# Patient Record
Sex: FEMALE | Race: WHITE | URBAN - METROPOLITAN AREA
[De-identification: names, ages, dates, MRNs, and addresses within clinical notes are randomized per-mention and may not be internally consistent; named-entity substitution may affect disease eponyms.]

---

## 2017-04-07 ENCOUNTER — HOSPITAL ENCOUNTER (EMERGENCY)
Facility: CLINIC | Age: 67
Discharge: HOME | End: 2017-04-07
Attending: EMERGENCY MEDICINE | Admitting: EMERGENCY MEDICINE
Payer: MEDICARE

## 2017-04-07 VITALS
TEMPERATURE: 98.2 F | WEIGHT: 160 LBS | RESPIRATION RATE: 18 BRPM | HEART RATE: 63 BPM | HEIGHT: 66 IN | OXYGEN SATURATION: 100 % | BODY MASS INDEX: 25.71 KG/M2 | DIASTOLIC BLOOD PRESSURE: 74 MMHG | SYSTOLIC BLOOD PRESSURE: 126 MMHG

## 2017-04-07 DIAGNOSIS — E87.5 HYPERKALEMIA: ICD-10-CM

## 2017-04-07 LAB — POTASSIUM SERPL-SCNC: 4.2 MMOL/L (ref 3.4–5.3)

## 2017-04-07 PROCEDURE — 99284 EMERGENCY DEPT VISIT MOD MDM: CPT

## 2017-04-07 PROCEDURE — 93005 ELECTROCARDIOGRAM TRACING: CPT

## 2017-04-07 PROCEDURE — 84132 ASSAY OF SERUM POTASSIUM: CPT | Performed by: EMERGENCY MEDICINE

## 2017-04-07 NOTE — ED AVS SNAPSHOT
Emergency Department    [address] [facility] [address]    Phone:  [phone]    Fax:  [phone]                                       Bronwyn Brooks   MRN: [id number]    Department:   Emergency Department   Date of Visit:  4/7/2017           Patient Information     Date Of Birth          1950        Your diagnoses for this visit were:     Hyperkalemia - resolved        You were seen by Trierweiler, Chad A, MD.      Follow-up Information     Follow up with Malgorzata Cordero NP In 1 week.    Specialty:  Nurse Practitioner - Family    Why:  As needed    Contact information:    XXX RETIRED XXX  [address]  [facility] [address] [phone]          Follow up with  Emergency Department.    Specialty:  EMERGENCY MEDICINE    Why:  If symptoms worsen    Contact information:    [address] [facility] [id number] [phone]        Discharge Instructions       Your labs are normal.  Continue to eat/drink normally.  Return to the ER for any emergent concerns.    24 Hour Appointment Hotline       To make an appointment at any [facility], call [phone] ([phone]). If you don't have a family doctor or clinic, we will help you find one. [facility] are conveniently located to serve the needs of you and your family.             Review of your medicines      Our records show that you are taking the medicines listed below. If these are incorrect, please call your family doctor or clinic.        Dose / Directions Last dose taken    CALCIUM 600 + D 600-200 MG-UNIT Tabs   Quantity:  3 MONTHS        1 tab twice daily   Refills:  1 YEAR        lisinopril 10 MG tablet   Commonly known as:  PRINIVIL/ZESTRIL   Dose:  10 mg   Quantity:  90 tablet        Take 1 tablet (10 mg) by mouth daily   Refills:  1        Multi-vitamin Tabs tablet   Quantity:  30   Generic drug:  multivitamin, therapeutic with minerals        1 TABLET DAILY   Refills:  0                 Procedures and tests performed during your visit     EKG 12 lead    Potassium      Orders Needing Specimen Collection     None      Pending Results     No orders found from 4/5/2017 to 4/8/2017.            Pending Culture Results     No orders found from 4/5/2017 to 4/8/2017.            Test Results From Your Hospital Stay        4/7/2017 10:21 PM      Component Results     Component Value Ref Range & Units Status    Potassium 4.2 3.4 - 5.3 mmol/L Final                Clinical Quality Measure: Blood Pressure Screening     Your blood pressure was checked while you were in the emergency department today. The last reading we obtained was  BP: 126/74 . Please read the guidelines below about what these numbers mean and what you should do about them.  If your systolic blood pressure (the top number) is less than 120 and your diastolic blood pressure (the bottom number) is less than 80, then your blood pressure is normal. There is nothing more that you need to do about it.  If your systolic blood pressure (the top number) is 120-139 or your diastolic blood pressure (the bottom number) is 80-89, your blood pressure may be higher than it should be. You should have your blood pressure rechecked within a year by a primary care provider.  If your systolic blood pressure (the top number) is 140 or greater or your diastolic blood pressure (the bottom number) is 90 or greater, you may have high blood pressure. High blood pressure is treatable, but if left untreated over time it can put you at risk for heart attack, stroke, or kidney failure. You should have your blood pressure rechecked by a primary care provider within the next 4 weeks.  If your provider in the emergency department today gave you specific instructions to follow-up with your doctor or provider even sooner than that, you should follow that instruction and not wait for up to 4 weeks for your follow-up visit.        Thank you for choosing [facility]       Thank you  for choosing [facility] for your care. Our goal is always to provide you with excellent care. Hearing back from our patients is one way we can continue to improve our services. Please take a few minutes to complete the written survey that you may receive in the mail after you visit with us. Thank you!        [employer] Information     [employer] gives you secure access to your electronic health record. If you see a primary care provider, you can also send messages to your care team and make appointments. If you have questions, please call your primary care clinic.  If you do not have a primary care provider, please call [phone] and they will assist you.        Care EveryWhere ID     This is your Care EveryWhere ID. This could be used by other organizations to access your [facility] medical records  [id number]        After Visit Summary       This is your record. Keep this with you and show to your community pharmacist(s) and doctor(s) at your next visit.

## 2017-04-07 NOTE — ED AVS SNAPSHOT
Emergency Department    [address] [place] [address]    Phone:  [phone]    Fax:  [phone]                                       Bronwyn Brooks   MRN: [id number]    Department:   Emergency Department   Date of Visit:  4/7/2017           After Visit Summary Signature Page     I have received my discharge instructions, and my questions have been answered. I have discussed any challenges I see with this plan with the nurse or doctor.    ..........................................................................................................................................  Patient/Patient Representative Signature      ..........................................................................................................................................  Patient Representative Print Name and Relationship to Patient    ..................................................               ................................................  Date                                            Time    ..........................................................................................................................................  Reviewed by Signature/Title    ...................................................              ..............................................  Date                                                            Time

## 2017-04-08 NOTE — ED PROVIDER NOTES
"  History     Chief Complaint:  Elevated Potassium Level    HPI   Bronwyn Brooks is a 66 year old female with a PMH significant for Raynaud's disease and hypertension who presents to the emergency department for evaluation of an elevated potassium level. The patient says she was having routine bloodwork done today when her potassium came back elevated at 6.0. Clinic staff immediately sent her to the ED for further evaluation. Here in the ED, the patient denies having any symptoms whatsoever.     Allergies:  No Known Drug Allergies    Medications:    Lisinopril    Past Medical History:    Absence of menstruation  Hypertension  Raynaud's disease    Past Surgical History:    Vaginal delivery  Appendectomy  D&C with ablation    Family History:    Cancer  Diabetes    Social History:   Tobacco use:    Negative  Alcohol use:    1.0 oz/week  Marital status:          Review of Systems   All other systems reviewed and are negative.    Physical Exam   First Vitals:  BP: 126/74  Pulse: 63  Temp: 98.2  F (36.8  C)  Resp: 18  Height: 167.6 cm (5' 6\")  Weight: 72.6 kg (160 lb)  SpO2: 100 %      Physical Exam  Musculoskeletal:  Normal movement of all extremities without evidence for deficit.    Skin:  Warm and dry without rashes.    Neurologic:  Non-focal exam without asymmetric weakness or numbness.    Psychiatric:  Normal affect with appropriate interaction with examiner.      Emergency Department Course   ECG (21:22:19):  Indication: Screening for cardiovascular disease.   Rate 60 bpm. TX interval 140. QRS duration 84. QT/QTc 412/412. P-R-T axes 50 49 49.   Interpretation: Normal sinus rhythm. Possible left atrial enlargement. Borderline ECG.   Agree with computer interpretation.   Interpreted at 2225 by Dr. Trierweiler.     Laboratory:  Potassium: 4.2     Emergency Department Course:  Nursing notes and vitals reviewed.   2220: I performed an exam of the patient as documented above.   The above workup was undertaken. "   I personally reviewed the laboratory results with the Patient and spouse and answered all related questions prior to discharge.   Findings and plan explained to the Patient and spouse. Patient discharged home with instructions regarding supportive care, medications, and reasons to return. The importance of close follow-up was reviewed.   Impression & Plan      Medical Decision Making:  Bronwyn Brooks is a 66 year old female who presents to us because she had an elevated potassium of 6.0 on her normal physical today. She advised to come to the ED for evaluation. I redrew her potassium here and found it to be normal at 4.2. The earlier spurious value was likely secondary to hemolysis. She otherwise has no complaints and I feel she is safe for discharge without further workup.     Diagnosis:    ICD-10-CM    1. Hyperkalemia - resolved E87.5        Disposition:  Discharged to home.      I, Gabriel Soto, am serving as a scribe at 9:37 PM on 4/7/2017 to document services personally performed by Dr. Trierweiler, based on the provider's statements to me.  Gabriel Soto  4/7/2017    EMERGENCY DEPARTMENT       Trierweiler, Chad A, MD  04/08/17 1167

## 2017-04-08 NOTE — DISCHARGE INSTRUCTIONS
Your labs are normal.  Continue to eat/drink normally.  Return to the ER for any emergent concerns.

## 2017-08-12 ENCOUNTER — HEALTH MAINTENANCE LETTER (OUTPATIENT)
Age: 67
End: 2017-08-12

## 2018-08-19 ENCOUNTER — HEALTH MAINTENANCE LETTER (OUTPATIENT)
Age: 68
End: 2018-08-19

## 2019-11-06 ENCOUNTER — HEALTH MAINTENANCE LETTER (OUTPATIENT)
Age: 69
End: 2019-11-06

## 2020-02-16 ENCOUNTER — HEALTH MAINTENANCE LETTER (OUTPATIENT)
Age: 70
End: 2020-02-16

## 2020-11-29 ENCOUNTER — HEALTH MAINTENANCE LETTER (OUTPATIENT)
Age: 70
End: 2020-11-29

## 2021-04-10 ENCOUNTER — HEALTH MAINTENANCE LETTER (OUTPATIENT)
Age: 71
End: 2021-04-10

## 2021-09-19 ENCOUNTER — HEALTH MAINTENANCE LETTER (OUTPATIENT)
Age: 71
End: 2021-09-19

## 2022-01-09 ENCOUNTER — HEALTH MAINTENANCE LETTER (OUTPATIENT)
Age: 72
End: 2022-01-09

## 2022-05-01 ENCOUNTER — HEALTH MAINTENANCE LETTER (OUTPATIENT)
Age: 72
End: 2022-05-01

## 2022-11-21 ENCOUNTER — HEALTH MAINTENANCE LETTER (OUTPATIENT)
Age: 72
End: 2022-11-21

## 2023-04-16 ENCOUNTER — HEALTH MAINTENANCE LETTER (OUTPATIENT)
Age: 73
End: 2023-04-16

## 2023-06-02 ENCOUNTER — HEALTH MAINTENANCE LETTER (OUTPATIENT)
Age: 73
End: 2023-06-02